# Patient Record
Sex: FEMALE | Race: WHITE | Employment: OTHER | URBAN - METROPOLITAN AREA
[De-identification: names, ages, dates, MRNs, and addresses within clinical notes are randomized per-mention and may not be internally consistent; named-entity substitution may affect disease eponyms.]

---

## 2017-06-26 ENCOUNTER — HOSPITAL ENCOUNTER (OUTPATIENT)
Dept: GENERAL RADIOLOGY | Age: 67
Discharge: HOME | End: 2017-06-26
Attending: OBSTETRICS & GYNECOLOGY | Admitting: OBSTETRICS & GYNECOLOGY

## 2017-06-26 DIAGNOSIS — M85.80 OTHER SPECIFIED DISORDERS OF BONE DENSITY AND STRUCTURE, UNSPECIFIED SITE: ICD-10-CM

## 2017-06-26 DIAGNOSIS — Z13.820 OSTEOPOROSIS SCREENING: ICD-10-CM

## 2017-06-26 DIAGNOSIS — M85.80 OSTEOPENIA: ICD-10-CM

## 2017-07-26 ENCOUNTER — HOSPITAL ENCOUNTER (OUTPATIENT)
Dept: GENERAL RADIOLOGY | Age: 67
Discharge: HOME | End: 2017-07-26
Attending: INTERNAL MEDICINE | Admitting: INTERNAL MEDICINE

## 2017-07-26 DIAGNOSIS — R13.10 DYSPHAGIA, UNSPECIFIED TYPE: ICD-10-CM

## 2017-09-11 ENCOUNTER — HOSPITAL ENCOUNTER (OUTPATIENT)
Dept: MAMMOGRAPHY | Age: 67
Discharge: HOME | End: 2017-09-11
Admitting: INTERNAL MEDICINE

## 2017-09-11 DIAGNOSIS — Z12.31 ENCOUNTER FOR SCREENING MAMMOGRAM FOR BREAST CANCER: ICD-10-CM

## 2017-10-24 ENCOUNTER — HOSPITAL ENCOUNTER (OUTPATIENT)
Dept: OTHER | Age: 67
Discharge: HOME | End: 2017-10-24
Attending: INTERNAL MEDICINE | Admitting: INTERNAL MEDICINE

## 2017-10-24 VITALS
OXYGEN SATURATION: 98 % | BODY MASS INDEX: 32.3 KG/M2 | HEART RATE: 72 BPM | TEMPERATURE: 98 F | WEIGHT: 201 LBS | HEIGHT: 66 IN | RESPIRATION RATE: 20 BRPM | DIASTOLIC BLOOD PRESSURE: 72 MMHG | SYSTOLIC BLOOD PRESSURE: 149 MMHG

## 2017-10-24 RX ORDER — SODIUM CHLORIDE, SODIUM LACTATE, POTASSIUM CHLORIDE, CALCIUM CHLORIDE 600; 310; 30; 20 MG/100ML; MG/100ML; MG/100ML; MG/100ML
INJECTION, SOLUTION INTRAVENOUS CONTINUOUS
Status: DISCONTINUED | OUTPATIENT
Start: 2017-10-24 | End: 2017-10-25 | Stop reason: HOSPADM

## 2017-10-24 RX ORDER — CETIRIZINE HYDROCHLORIDE 5 MG/1
5 TABLET ORAL DAILY
COMMUNITY
End: 2020-03-05

## 2017-10-24 RX ORDER — BENZTROPINE MESYLATE 1 MG/1
1 TABLET ORAL 2 TIMES DAILY
COMMUNITY
End: 2019-05-15

## 2017-10-24 RX ADMIN — SODIUM CHLORIDE, SODIUM LACTATE, POTASSIUM CHLORIDE, CALCIUM CHLORIDE: 600; 310; 30; 20 INJECTION, SOLUTION INTRAVENOUS at 08:18

## 2017-10-24 ASSESSMENT — PAIN DESCRIPTION - DESCRIPTORS: DESCRIPTORS: ACHING

## 2017-10-24 ASSESSMENT — PAIN - FUNCTIONAL ASSESSMENT: PAIN_FUNCTIONAL_ASSESSMENT: 0-10

## 2017-10-24 NOTE — PLAN OF CARE
ENDOSCOPY  PRE, INTRA, & POST PROCEDURAL  CARE PLAN    HEMODYNAMIC STATUS  INTERDISCIPLINARY   Goal: Hemodynamic Status to Baseline / Discharge Criteria Met  Interventions     1. Obtain Patient  Medical /  Surgical History     1 Assess & Review Allergies Prior to Endo & All  Meds (PRN)     2. Assess / Monitor Vital Signs / LOC (PRN). Intra Procedure VS/ LOC  Q5 Mins  & As Per Policy Until Discharge. 3. Obtain Baseline Forrest & Post Procedural  Forrest Per   Policy     4. Check Monitors, Alarms On     5. Patient Re Assessed by Physician     6. Monitor  I&O Post Procedure   NURSING   SAFETY & PSYCHO SOCIAL  INTERDISCIPLINARY   Goal: Patient Returns to Baseline Activity/ Meets Discharge Criteria  Interventions     1. Greet Patient with ID Badge/ Picture in View (PRN)     2. Be Available & Sensitive to Patients Needs (PRN)     3. Communicate referral to Pastoral Care as Appropriate (PRN)     4. Provide Age Specific Measures (PRN)     4. Admission Data Base Reviewed     5. Administer Meds Per Orders (PRN)     5. Maintain Baseline Activity  Or Activity as Ordered Per Physician     NUTRITION   INTERDISCIPLINARY   Goal: Patient to baseline/ Improved Nutrition  Interventions     1. Assess NPO Status / Notify Physician if Necessary (PRN)     2. NPO per Physician Orders     3. Assess Nutritional Status (PRN)     4. Assess Ability to Swallow as Indicated     LAB & DIAGNOSTICS   Goal: Additional Tests per Physicians   Orders  Interventions     1. Lab & Diagnostics per Physician Orders (PRN)     2.  Obtain  Urine / Serum HCG & FSBS On All Diabetic Patients  Per Policy & (PRN)     3. Assess Lab Time Out  for  Patient Safety as Needed (PRN)   RESPIRATORY  INTERDISCIPLINARY   Goal: Airway   Patency, SAO2   Saturation, Cough mechanism Maintained   Interventions     1. Evaluate Bilateral Breath Sounds  Baseline, (PRN), & Prior to Discharge     2. Weight & Height Noted ( PRN)     3.   Assess Baseline SPo2 (PRN) 4. Monitor   SAO2   Continuously During Sedation/ Analgesia & Until Patient Meets D/C Criteria. 5. Resuscitation Equipment Available (PRN)   GASTROINTESTINAL  INTERDISCIPLINARY   Goal: Bowel Sounds Maintained   Interventions     1. Evaluate Baseline Bowel Sounds, (PRN), & Prior to Discharge     2. Monitor  Abdominal status of Patient Throughout Procedure     KNOWLEDGE DEFICIT, EDUCATION, DISCHARGE PLAN   INTERDISCIPLINARY    Patient / SO verbalize Understanding Of Procedural discharge Instructions  Interventions     1. Obtain Informed Consent (PRN)      2. Initiate ENDO Plan of Care, Answer Questions (PRN)       3. Assess Patients Ability to Understand Information (PRN)     3. Discharge Planning ; Assure  Presence of   upon Patient Discharge     4. Education / Communication  Ongoing As Appropriate     5. Keep Families Aware of Delays As Appropriate     6. Reinforce Discharge Teaching / Post  Procedure  Instructions (PRN)    PAIN MANAGEMENT  INTERDISCIPLINARY   Goal:Patient Return to Pre Procedure Comfort  Interventions     1. Assess Baseline  Pain Level (PRN)     2. Intra Procedure ;  Evaluation & Assessment Of  Pain  is Ongoing     3. Post procedure; Assess Pain Level Once Awake/ Prior  To Discharge     2. Administer Analgesics as Ordered (PRN)      3. Assess Effectiveness of Pain Management (PRN)       Re-Assess Patient  after all Interventions. Assess Pain Level 30 - 60 Minutes After Pain Management Intervention.      4. Provide Discharge Teaching

## 2017-10-24 NOTE — OP NOTE
[address]                [address] [place] [address]                               OPERATIVE REPORT    PATIENT NAME: Wade Long                       :             1950  MED REC NO:   [id number]                           ROOM:  ACCOUNT NO:   [de-identified]                           ADMISSION DATE:  10/24/2017  PROVIDER:     Ambika Vega MD    DATE OF PROCEDURE:  10/24/2017    PREPROCEDURE DIAGNOSES:  1. History of colon polyps. 2.  Chronic abdominal pain. PROCEDURE:  Colonoscopy incomplete due to poor prep. POSTPROCEDURE DIAGNOSIS:  Incomplete colonoscopy to the rectum. PROCEDURE INDICATIONS:  A 72-year-old female with a history of GERD,  chronic abdominal pain, anxiety, depression, mitral valve prolapse and  prior history of colon polyps, presents for surveillance. She had a  colonoscopy a year ago, but it was inadequate prep. She was just  recommended to repeat colonoscopy with a two-day prep. She presents  today for routine surveillance colonoscopy. MEDICATIONS:  None. PROCEDURE DETAILS:  Informed consent was obtained after discussing  risks, benefits, and alternatives. Full history and physical was  performed. The patient was classified as ASA class II. Medications  were not given. Cardiopulmonary status was continuously monitored  throughout the procedure. The patient was placed in left lateral  decubitus position. Once the patient was deemed to be adequately  positioned, a standard pediatric colonoscope was inserted in the anus  and advanced to the rectum. The scope was not advanced to any further  due to inadequate prep. The procedure was aborted. The patient was  sent to the recovery unit. FINDINGS:  RECTUM:  The digital rectal exam was normal.  COLON:  There is a large amount of thick dark stool coating the mucosa  making it inadequate for evaluation. The procedure was aborted. Scope was withdrawn. The patient was transferred to recovery. SUMMARY:  1. Poor prep. 2.  Inadequate for visualization. RECOMMENDATIONS:  1. Discharged patient to home when standard parameters are met. 2.  Repeat colonoscopy with a two-day prep with MAC anesthesia. 3.  Follow up in the office as needed.     Brandy Kahn MD    D: 10/24/2017 8:56:17       T: 10/24/2017 9:32:18     [email or web address]  Job#: [id number]     Doc#: [id number]    CC:  Ba Ocampo MD.

## 2017-11-21 ENCOUNTER — HOSPITAL ENCOUNTER (OUTPATIENT)
Dept: SURGERY | Age: 67
Discharge: HOME | End: 2017-11-21
Attending: INTERNAL MEDICINE | Admitting: INTERNAL MEDICINE

## 2017-11-21 VITALS
BODY MASS INDEX: 32.3 KG/M2 | WEIGHT: 201 LBS | SYSTOLIC BLOOD PRESSURE: 147 MMHG | RESPIRATION RATE: 16 BRPM | OXYGEN SATURATION: 96 % | HEIGHT: 66 IN | TEMPERATURE: 97.2 F | DIASTOLIC BLOOD PRESSURE: 85 MMHG | HEART RATE: 74 BPM

## 2017-11-21 RX ORDER — SODIUM CHLORIDE, SODIUM LACTATE, POTASSIUM CHLORIDE, CALCIUM CHLORIDE 600; 310; 30; 20 MG/100ML; MG/100ML; MG/100ML; MG/100ML
INJECTION, SOLUTION INTRAVENOUS ONCE
Status: COMPLETED | OUTPATIENT
Start: 2017-11-21 | End: 2017-11-21

## 2017-11-21 RX ORDER — SODIUM CHLORIDE 0.9 % (FLUSH) 0.9 %
10 SYRINGE (ML) INJECTION EVERY 12 HOURS SCHEDULED
Status: DISCONTINUED | OUTPATIENT
Start: 2017-11-21 | End: 2017-11-22 | Stop reason: HOSPADM

## 2017-11-21 RX ORDER — MEPERIDINE HYDROCHLORIDE 25 MG/ML
12.5 INJECTION INTRAMUSCULAR; INTRAVENOUS; SUBCUTANEOUS EVERY 5 MIN PRN
Status: DISCONTINUED | OUTPATIENT
Start: 2017-11-21 | End: 2017-11-22 | Stop reason: HOSPADM

## 2017-11-21 RX ORDER — OXYCODONE HYDROCHLORIDE 5 MG/1
10 TABLET ORAL PRN
Status: ACTIVE | OUTPATIENT
Start: 2017-11-21 | End: 2017-11-21

## 2017-11-21 RX ORDER — MORPHINE SULFATE 4 MG/ML
1 INJECTION, SOLUTION INTRAMUSCULAR; INTRAVENOUS EVERY 5 MIN PRN
Status: DISCONTINUED | OUTPATIENT
Start: 2017-11-21 | End: 2017-11-22 | Stop reason: HOSPADM

## 2017-11-21 RX ORDER — LABETALOL HYDROCHLORIDE 5 MG/ML
5 INJECTION, SOLUTION INTRAVENOUS EVERY 10 MIN PRN
Status: DISCONTINUED | OUTPATIENT
Start: 2017-11-21 | End: 2017-11-22 | Stop reason: HOSPADM

## 2017-11-21 RX ORDER — OXYCODONE HYDROCHLORIDE 5 MG/1
5 TABLET ORAL PRN
Status: ACTIVE | OUTPATIENT
Start: 2017-11-21 | End: 2017-11-21

## 2017-11-21 RX ORDER — HYDRALAZINE HYDROCHLORIDE 20 MG/ML
5 INJECTION INTRAMUSCULAR; INTRAVENOUS EVERY 10 MIN PRN
Status: DISCONTINUED | OUTPATIENT
Start: 2017-11-21 | End: 2017-11-22 | Stop reason: HOSPADM

## 2017-11-21 RX ORDER — SODIUM CHLORIDE, SODIUM LACTATE, POTASSIUM CHLORIDE, CALCIUM CHLORIDE 600; 310; 30; 20 MG/100ML; MG/100ML; MG/100ML; MG/100ML
INJECTION, SOLUTION INTRAVENOUS CONTINUOUS
Status: DISCONTINUED | OUTPATIENT
Start: 2017-11-21 | End: 2017-11-22 | Stop reason: HOSPADM

## 2017-11-21 RX ORDER — LIDOCAINE HYDROCHLORIDE 10 MG/ML
1 INJECTION, SOLUTION INFILTRATION; PERINEURAL
Status: ACTIVE | OUTPATIENT
Start: 2017-11-21 | End: 2017-11-21

## 2017-11-21 RX ORDER — DIPHENHYDRAMINE HYDROCHLORIDE 50 MG/ML
12.5 INJECTION INTRAMUSCULAR; INTRAVENOUS
Status: ACTIVE | OUTPATIENT
Start: 2017-11-21 | End: 2017-11-21

## 2017-11-21 RX ORDER — SODIUM CHLORIDE 0.9 % (FLUSH) 0.9 %
10 SYRINGE (ML) INJECTION PRN
Status: DISCONTINUED | OUTPATIENT
Start: 2017-11-21 | End: 2017-11-22 | Stop reason: HOSPADM

## 2017-11-21 RX ORDER — METOCLOPRAMIDE HYDROCHLORIDE 5 MG/ML
10 INJECTION INTRAMUSCULAR; INTRAVENOUS
Status: ACTIVE | OUTPATIENT
Start: 2017-11-21 | End: 2017-11-21

## 2017-11-21 RX ADMIN — SODIUM CHLORIDE, SODIUM LACTATE, POTASSIUM CHLORIDE, CALCIUM CHLORIDE: 600; 310; 30; 20 INJECTION, SOLUTION INTRAVENOUS at 09:48

## 2017-11-21 ASSESSMENT — PAIN - FUNCTIONAL ASSESSMENT: PAIN_FUNCTIONAL_ASSESSMENT: 0-10

## 2017-11-21 NOTE — H&P
History and Physical / Pre-Sedation Assessment    Patient:  Celsa Nino   :   1950     Intended Procedure:  colon    HPI: screening    Nurses notes reviewed and agreed. Medications reviewed  Allergies: Allergies   Allergen Reactions    Lithium Other (See Comments)     Seizures     Prednisone     Sulfa Antibiotics     Tegretol [Carbamazepine]     Thorazine [Chlorpromazine Hcl]        Physical Exam:  Vital Signs: /71   Pulse 82   Temp 98.1 °F (36.7 °C) (Temporal)   Resp 19   Ht 5' 6\" (1.676 m)   Wt 201 lb (91.2 kg)   SpO2 97%   BMI 32.44 kg/m²    Airway:Normal  Pulmonary:Normal  Cardiac:Normal  Abdomen:Normal    Pre-Procedure Assessment / Plan:  ASA:CLASS 2  Level of Sedation Plan:MAC  Post Procedure plan: Return to same level of care    I assessed the patient and find that the patient is in satisfactory condition to proceed with the planned procedure and sedation plan. I have explained the risk, benefits, and alternatives to the procedure; the patient understands and agrees to proceed.        Crista  2017

## 2017-11-21 NOTE — PROCEDURES
Regan Covarrubias   11/21/2017  Colonoscopy  A pre-procedure re-evaluation was performed immediately prior to the procedure.   Preprocedure Dx:polyp  Postprocedure Dx: Diverticulosis  Medications: Procedural sedation with Versed & Fentanyl  Complications: None  Estimated Blood Loss: <5cc  Specimens: were not obtained  Ledy Linares

## 2017-11-21 NOTE — OP NOTE
[address]                  [address] [place] [address]                                 OPERATIVE REPORT    PATIENT NAME: Cristine Gross                      :        1950  MED REC NO:   [id number]                          ROOM:  ACCOUNT NO:   [de-identified]                          ADMIT DATE: 2017  PROVIDER:     Sherif Amin MD    DATE OF PROCEDURE:  2017    PREOPERATIVE DIAGNOSIS:  History of colonic polyps. POSTOPERATIVE DIAGNOSIS:  Diverticulosis. OPERATION PERFORMED:   Colonoscopy to cecum. SURGEON:  Sherif Amin MD    INDICATIONS:  This 19-year-old referred for evaluation because of previous  history of colonic polyps. This patient was on a two-day prep and still  the prep was suboptimal.    PROCEDURE:  Informed consent was obtained. IV sedation was given by  anesthesia department under monitored anesthesia care. FINDINGS:  RECTUM:  Normal.  SIGMOID COLON:  Showed a few diverticula. Prep was suboptimal.  There were  placed where there was still some solid stool. DESCENDING COLON:  Normal.  TRANSVERSE COLON:  Normal.  ASCENDING COLON TO CECUM:  There was still some amount of fecal effluent in  the right colon as well. I did not see any large lesions. The procedure was discontinued. IMPRESSION:  Normal colonoscopy. Diverticulosis. Prep was suboptimal.    SUGGESTIONS:  Would be to go ahead and have the patient follow up with Dr. Zackary Garcia. We will consider repeat colonoscopy in 5 years. She has been tried  multiple times here with colonoscopy and this is the third or fourth  attempt, and I think if she has any further symptoms I might be inclined to  probably go ahead and repeat CAT scan. Thank you for the consult. We will follow with you. Mina Ferreira MD    D: 2017 10:40:28       T: 2017 11:17:00     [email or web address]  Job#: [id number]     Doc#: [id number]    CC:   [facility],

## 2017-11-21 NOTE — ANESTHESIA PRE-OP
daily.      Historical Provider, MD   hydrocodone-acetaminophen (VICODIN) 5-500 MG per tablet Take 1 tablet by mouth every 6 hours as needed. Historical Provider, MD   oxybutynin (DITROPAN) 5 MG tablet   Take 10 mg by mouth nightly     Historical Provider, MD   phenytoin (DILANTIN) 100 MG ER capsule Take 200 mg by mouth 2 times daily. Historical Provider, MD   phenytoin (DILANTIN) 100 MG ER capsule Take 100 mg by mouth daily. Historical Provider, MD   risperidone (RISPERDAL) 1 MG tablet   Take 1 mg by mouth 3 times daily     Historical Provider, MD   sertraline (ZOLOFT) 100 MG tablet   Take 100 mg by mouth daily     Historical Provider, MD   topiramate (TOPAMAX) 100 MG tablet Take 150 mg by mouth daily     Historical Provider, MD   albuterol (PROVENTIL HFA;VENTOLIN HFA) 108 (90 BASE) MCG/ACT inhaler Inhale 2 puffs into the lungs 2 times daily. Historical Provider, MD   carvedilol (COREG) 6.25 MG tablet Take 12.5 mg by mouth daily Indications: take [address] Provider, MD       Current medications:    Current Outpatient Prescriptions   Medication Sig Dispense Refill    benztropine (COGENTIN) 1 MG tablet Take 1 mg by mouth 2 times daily      Artificial Saliva (BIOTENE ORALBALANCE DRY MOUTH) LIQD liquid Take 10 mLs by mouth as needed      cetirizine (ZYRTEC) 5 MG tablet Take 5 mg by mouth daily      tiotropium (SPIRIVA) 18 MCG inhalation capsule Inhale 18 mcg into the lungs daily      miconazole (MICOTIN) 2 % cream Apply topically 2 times daily Apply topically 2 times daily.       traMADol (ULTRAM) 50 MG tablet Take 50 mg by mouth daily      fluticasone (FLONASE) 50 MCG/ACT nasal spray 1 spray by Nasal route daily      QUEtiapine (SEROQUEL) 100 MG tablet Take 100 mg by mouth 3 times daily      ARIPiprazole (ABILIFY) 15 MG tablet Take 15 mg by mouth 2 times daily      atorvastatin (LIPITOR) 20 MG tablet Take 20 mg by mouth nightly      docusate sodium (COLACE) 100 MG capsule Take 200 mg by mouth daily      donepezil (ARICEPT) 10 MG tablet Take 10 mg by mouth nightly      nortriptyline (PAMELOR) 10 MG capsule Take 10 mg by mouth nightly      omeprazole (PRILOSEC) 20 MG capsule Take 20 mg by mouth daily      trihexyphenidyl (ARTANE) 2 MG tablet Take 2 mg by mouth 2 times daily      therapeutic multivitamin-minerals (THERAGRAN-M) tablet Take 1 tablet by mouth daily.  hydrocodone-acetaminophen (VICODIN) 5-500 MG per tablet Take 1 tablet by mouth every 6 hours as needed.  oxybutynin (DITROPAN) 5 MG tablet   Take 10 mg by mouth nightly       phenytoin (DILANTIN) 100 MG ER capsule Take 200 mg by mouth 2 times daily.  phenytoin (DILANTIN) 100 MG ER capsule Take 100 mg by mouth daily.  risperidone (RISPERDAL) 1 MG tablet   Take 1 mg by mouth 3 times daily       sertraline (ZOLOFT) 100 MG tablet   Take 100 mg by mouth daily       topiramate (TOPAMAX) 100 MG tablet Take 150 mg by mouth daily       albuterol (PROVENTIL HFA;VENTOLIN HFA) 108 (90 BASE) MCG/ACT inhaler Inhale 2 puffs into the lungs 2 times daily.  carvedilol (COREG) 6.25 MG tablet Take 12.5 mg by mouth daily Indications: take dos        No current facility-administered medications for this encounter. Allergies: Allergies   Allergen Reactions    Lithium Other (See Comments)     Seizures     Prednisone     Sulfa Antibiotics     Tegretol [Carbamazepine]     Thorazine [Chlorpromazine Hcl]        Problem List:  There is no problem list on file for this patient.       Past Medical History:        Diagnosis Date    Bipolar 1 disorder ([facility] [address]     COPD (chronic obstructive pulmonary disease) (HCC)     Degenerative disc disease     Gastric ulcer     History of blood transfusion     Hx of lumpectomy     right breast    Hyperlipidemia     Hypertension     Mitral valve prolapse     OCD (obsessive compulsive disorder)     Thyroid disease        Past Surgical History:        Procedure

## 2017-12-06 NOTE — ANESTHESIA POST-OP
Postoperative Anesthesia Note    Name:    Karie Rush  MRN:      [id number]    No data found. LABS:    CBC  Lab Results   Component Value Date/Time    WBC 9.2 01/31/2016 08:00 PM    HGB 13.3 01/31/2016 08:00 PM    HCT 40.6 01/31/2016 08:00 PM     01/31/2016 08:00 PM     RENAL  Lab Results   Component Value Date/Time     01/31/2016 08:00 PM    K 4.2 01/31/2016 08:00 PM     01/31/2016 08:00 PM    CO2 25 01/31/2016 08:00 PM    BUN 9 01/31/2016 08:00 PM    CREATININE <0.5 (L) 09/26/2016 02:44 PM    GLUCOSE 103 (H) 01/31/2016 08:00 PM     COAGS  No results found for: PROTIME, INR, APTT    Intake & Output:  No intake/output data recorded. Nausea & Vomiting:  No    Level of Consciousness:  Awake    Pain Assessment:  Adequate analgesia    Anesthesia Complications:  No apparent anesthetic complications    SUMMARY      Vital signs stable  OK to discharge from Stage I post anesthesia care.   Care transferred from Anesthesiology department on discharge from perioperative area

## 2018-11-05 ENCOUNTER — HOSPITAL ENCOUNTER (OUTPATIENT)
Dept: MAMMOGRAPHY | Age: 68
Discharge: HOME | End: 2018-11-05
Payer: MEDICARE

## 2018-11-05 DIAGNOSIS — Z12.39 BREAST CANCER SCREENING: ICD-10-CM

## 2018-11-05 PROCEDURE — 77063 BREAST TOMOSYNTHESIS BI: CPT

## 2018-12-20 ENCOUNTER — HOSPITAL ENCOUNTER (OUTPATIENT)
Age: 68
Setting detail: OUTPATIENT SURGERY
Discharge: HOME | End: 2018-12-20
Attending: INTERNAL MEDICINE | Admitting: INTERNAL MEDICINE
Payer: MEDICARE

## 2018-12-20 ENCOUNTER — ANESTHESIA (OUTPATIENT)
Dept: ENDOSCOPY | Age: 68
End: 2018-12-20
Payer: MEDICARE

## 2018-12-20 ENCOUNTER — ANESTHESIA EVENT (OUTPATIENT)
Dept: ENDOSCOPY | Age: 68
End: 2018-12-20
Payer: MEDICARE

## 2018-12-20 VITALS
DIASTOLIC BLOOD PRESSURE: 68 MMHG | SYSTOLIC BLOOD PRESSURE: 148 MMHG | OXYGEN SATURATION: 97 % | RESPIRATION RATE: 24 BRPM

## 2018-12-20 VITALS
TEMPERATURE: 98.5 F | HEART RATE: 81 BPM | HEIGHT: 64 IN | WEIGHT: 230 LBS | OXYGEN SATURATION: 96 % | RESPIRATION RATE: 18 BRPM | SYSTOLIC BLOOD PRESSURE: 145 MMHG | BODY MASS INDEX: 39.27 KG/M2 | DIASTOLIC BLOOD PRESSURE: 56 MMHG

## 2018-12-20 PROCEDURE — 2500000003 HC RX 250 WO HCPCS: Performed by: NURSE ANESTHETIST, CERTIFIED REGISTERED

## 2018-12-20 PROCEDURE — 6360000002 HC RX W HCPCS: Performed by: NURSE ANESTHETIST, CERTIFIED REGISTERED

## 2018-12-20 PROCEDURE — 3700000001 HC ADD 15 MINUTES (ANESTHESIA): Performed by: INTERNAL MEDICINE

## 2018-12-20 PROCEDURE — 3700000000 HC ANESTHESIA ATTENDED CARE: Performed by: INTERNAL MEDICINE

## 2018-12-20 PROCEDURE — 88342 IMHCHEM/IMCYTCHM 1ST ANTB: CPT

## 2018-12-20 PROCEDURE — 2709999900 HC NON-CHARGEABLE SUPPLY: Performed by: INTERNAL MEDICINE

## 2018-12-20 PROCEDURE — 88312 SPECIAL STAINS GROUP 1: CPT

## 2018-12-20 PROCEDURE — 7100000010 HC PHASE II RECOVERY - FIRST 15 MIN: Performed by: INTERNAL MEDICINE

## 2018-12-20 PROCEDURE — 88305 TISSUE EXAM BY PATHOLOGIST: CPT

## 2018-12-20 PROCEDURE — 2580000003 HC RX 258: Performed by: INTERNAL MEDICINE

## 2018-12-20 PROCEDURE — 3609012400 HC EGD TRANSORAL BIOPSY SINGLE/MULTIPLE: Performed by: INTERNAL MEDICINE

## 2018-12-20 PROCEDURE — 88341 IMHCHEM/IMCYTCHM EA ADD ANTB: CPT

## 2018-12-20 PROCEDURE — 7100000011 HC PHASE II RECOVERY - ADDTL 15 MIN: Performed by: INTERNAL MEDICINE

## 2018-12-20 RX ORDER — FENTANYL CITRATE 50 UG/ML
INJECTION, SOLUTION INTRAMUSCULAR; INTRAVENOUS PRN
Status: DISCONTINUED | OUTPATIENT
Start: 2018-12-20 | End: 2018-12-20 | Stop reason: SDUPTHER

## 2018-12-20 RX ORDER — LIDOCAINE HYDROCHLORIDE 20 MG/ML
INJECTION, SOLUTION INFILTRATION; PERINEURAL PRN
Status: DISCONTINUED | OUTPATIENT
Start: 2018-12-20 | End: 2018-12-20 | Stop reason: SDUPTHER

## 2018-12-20 RX ORDER — MIDAZOLAM HYDROCHLORIDE 5 MG/ML
INJECTION INTRAMUSCULAR; INTRAVENOUS PRN
Status: DISCONTINUED | OUTPATIENT
Start: 2018-12-20 | End: 2018-12-20 | Stop reason: SDUPTHER

## 2018-12-20 RX ORDER — SODIUM CHLORIDE, SODIUM LACTATE, POTASSIUM CHLORIDE, CALCIUM CHLORIDE 600; 310; 30; 20 MG/100ML; MG/100ML; MG/100ML; MG/100ML
INJECTION, SOLUTION INTRAVENOUS CONTINUOUS
Status: DISCONTINUED | OUTPATIENT
Start: 2018-12-20 | End: 2018-12-20 | Stop reason: HOSPADM

## 2018-12-20 RX ORDER — PROPOFOL 10 MG/ML
INJECTION, EMULSION INTRAVENOUS PRN
Status: DISCONTINUED | OUTPATIENT
Start: 2018-12-20 | End: 2018-12-20 | Stop reason: SDUPTHER

## 2018-12-20 RX ADMIN — PROPOFOL 30 MG: 10 INJECTION, EMULSION INTRAVENOUS at 13:40

## 2018-12-20 RX ADMIN — LIDOCAINE HYDROCHLORIDE 60 MG: 20 INJECTION, SOLUTION INFILTRATION; PERINEURAL at 13:32

## 2018-12-20 RX ADMIN — FENTANYL CITRATE 50 MCG: 50 INJECTION INTRAMUSCULAR; INTRAVENOUS at 13:32

## 2018-12-20 RX ADMIN — FENTANYL CITRATE 25 MCG: 50 INJECTION INTRAMUSCULAR; INTRAVENOUS at 13:38

## 2018-12-20 RX ADMIN — FENTANYL CITRATE 25 MCG: 50 INJECTION INTRAMUSCULAR; INTRAVENOUS at 13:41

## 2018-12-20 RX ADMIN — SODIUM CHLORIDE, POTASSIUM CHLORIDE, SODIUM LACTATE AND CALCIUM CHLORIDE: 600; 310; 30; 20 INJECTION, SOLUTION INTRAVENOUS at 13:27

## 2018-12-20 RX ADMIN — PROPOFOL 60 MG: 10 INJECTION, EMULSION INTRAVENOUS at 13:35

## 2018-12-20 RX ADMIN — MIDAZOLAM HYDROCHLORIDE 2.5 MG: 5 INJECTION INTRAMUSCULAR; INTRAVENOUS at 13:32

## 2018-12-20 ASSESSMENT — PAIN DESCRIPTION - DESCRIPTORS
DESCRIPTORS: ACHING

## 2018-12-20 ASSESSMENT — PAIN DESCRIPTION - LOCATION
LOCATION: HIP

## 2018-12-20 ASSESSMENT — PAIN DESCRIPTION - PAIN TYPE
TYPE: CHRONIC PAIN

## 2018-12-20 ASSESSMENT — PAIN - FUNCTIONAL ASSESSMENT: PAIN_FUNCTIONAL_ASSESSMENT: 0-10

## 2018-12-20 ASSESSMENT — PAIN SCALES - GENERAL
PAINLEVEL_OUTOF10: 7

## 2018-12-20 ASSESSMENT — PAIN DESCRIPTION - ORIENTATION
ORIENTATION: RIGHT;LEFT

## 2018-12-20 NOTE — ANESTHESIA PRE PROCEDURE
Department of Anesthesiology  Preprocedure Note       Name:  Whitney Arellano   Age:  79 y.o.  :  1950                                          MRN:  [id number]         Date:  2018      Surgeon: Xin Lopez):  Skylar Bernard DO    Procedure: EGD BIOPSY (N/A )    Medications prior to admission:   Prior to Admission medications    Medication Sig Start Date End Date Taking? Authorizing Provider   benztropine (COGENTIN) 1 MG tablet Take 1 mg by mouth 2 times daily    Historical Provider, MD   Artificial Saliva (BIOTENE ORALBALANCE DRY MOUTH) LIQD liquid Take 10 mLs by mouth as needed    Historical Provider, MD   cetirizine (ZYRTEC) 5 MG tablet Take 5 mg by mouth daily    Historical Provider, MD   tiotropium (SPIRIVA) 18 MCG inhalation capsule Inhale 18 mcg into the lungs daily    Historical Provider, MD   miconazole (MICOTIN) 2 % cream Apply topically 2 times daily Apply topically 2 times daily.     Historical Provider, MD   traMADol (ULTRAM) 50 MG tablet Take 50 mg by mouth daily    Historical Provider, MD   fluticasone (FLONASE) 50 MCG/ACT nasal spray 1 spray by Nasal route daily    Historical Provider, MD   QUEtiapine (SEROQUEL) 100 MG tablet Take 100 mg by mouth 3 times daily    Historical Provider, MD   ARIPiprazole (ABILIFY) 15 MG tablet Take 15 mg by mouth 2 times daily    Historical Provider, MD   atorvastatin (LIPITOR) 20 MG tablet Take 20 mg by mouth nightly    Historical Provider, MD   docusate sodium (COLACE) 100 MG capsule Take 200 mg by mouth daily    Historical Provider, MD   donepezil (ARICEPT) 10 MG tablet Take 10 mg by mouth nightly    Historical Provider, MD   nortriptyline (PAMELOR) 10 MG capsule Take 10 mg by mouth nightly    Historical Provider, MD   omeprazole (PRILOSEC) 20 MG capsule Take 20 mg by mouth daily    Historical Provider, MD   trihexyphenidyl (ARTANE) 2 MG tablet Take 2 mg by mouth 2 times daily    Historical Provider, MD   therapeutic multivitamin-minerals (THERAGRAN-M)

## 2018-12-20 NOTE — ANESTHESIA POSTPROCEDURE EVALUATION
Department of Anesthesiology  Postprocedure Note    Patient: See Gallegos  MRN: [id number]  YOB: 1950  Date of evaluation: 12/20/2018  Time:  2:58 PM     Procedure Summary     Date:  12/20/18 Room / Location:  [facility] ENDO 01 / [address] ENDOSCOPY    Anesthesia Start:  1327 Anesthesia Stop:  6035    Procedure:  EGD BIOPSY (N/A ) Diagnosis:  (DYSPHAGIA)    Surgeon:  Angelique Sanchez DO Responsible Provider:  Leopoldo Gleason, MD    Anesthesia Type:  MAC ASA Status:  3          Anesthesia Type: No value filed. Forrest Phase I: Forrest Score: 10    Forrest Phase II: Forrest Score: 10    Last vitals: Reviewed and per EMR flowsheets.        Anesthesia Post Evaluation    Patient location during evaluation: PACU  Patient participation: complete - patient participated  Level of consciousness: awake and alert  Pain score: 0  Airway patency: patent  Nausea & Vomiting: no nausea and no vomiting  Complications: no  Cardiovascular status: blood pressure returned to baseline  Respiratory status: acceptable  Hydration status: stable

## 2019-02-11 ENCOUNTER — HOSPITAL ENCOUNTER (OUTPATIENT)
Dept: GENERAL RADIOLOGY | Age: 69
Discharge: HOME | End: 2019-02-11
Payer: MEDICARE

## 2019-02-11 DIAGNOSIS — R13.10 PROBLEMS WITH SWALLOWING AND MASTICATION: ICD-10-CM

## 2019-02-11 PROCEDURE — 74230 X-RAY XM SWLNG FUNCJ C+: CPT

## 2019-02-25 ENCOUNTER — HOSPITAL ENCOUNTER (OUTPATIENT)
Age: 69
Setting detail: OUTPATIENT SURGERY
Discharge: HOME | End: 2019-02-25
Attending: INTERNAL MEDICINE | Admitting: INTERNAL MEDICINE
Payer: MEDICARE

## 2019-02-25 ENCOUNTER — ANESTHESIA (OUTPATIENT)
Dept: ENDOSCOPY | Age: 69
End: 2019-02-25
Payer: MEDICARE

## 2019-02-25 ENCOUNTER — ANESTHESIA EVENT (OUTPATIENT)
Dept: ENDOSCOPY | Age: 69
End: 2019-02-25
Payer: MEDICARE

## 2019-02-25 VITALS
WEIGHT: 215 LBS | SYSTOLIC BLOOD PRESSURE: 110 MMHG | RESPIRATION RATE: 16 BRPM | BODY MASS INDEX: 35.82 KG/M2 | DIASTOLIC BLOOD PRESSURE: 49 MMHG | HEART RATE: 77 BPM | HEIGHT: 65 IN | TEMPERATURE: 97.6 F | OXYGEN SATURATION: 100 %

## 2019-02-25 VITALS
OXYGEN SATURATION: 95 % | RESPIRATION RATE: 18 BRPM | SYSTOLIC BLOOD PRESSURE: 146 MMHG | DIASTOLIC BLOOD PRESSURE: 63 MMHG

## 2019-02-25 PROCEDURE — 7100000010 HC PHASE II RECOVERY - FIRST 15 MIN: Performed by: INTERNAL MEDICINE

## 2019-02-25 PROCEDURE — 6360000002 HC RX W HCPCS: Performed by: NURSE ANESTHETIST, CERTIFIED REGISTERED

## 2019-02-25 PROCEDURE — 7100000011 HC PHASE II RECOVERY - ADDTL 15 MIN: Performed by: INTERNAL MEDICINE

## 2019-02-25 PROCEDURE — 2580000003 HC RX 258: Performed by: INTERNAL MEDICINE

## 2019-02-25 PROCEDURE — 3609012800 HC EGD DIAGNOSTIC ONLY: Performed by: INTERNAL MEDICINE

## 2019-02-25 PROCEDURE — 2709999900 HC NON-CHARGEABLE SUPPLY: Performed by: INTERNAL MEDICINE

## 2019-02-25 PROCEDURE — 2500000003 HC RX 250 WO HCPCS: Performed by: NURSE ANESTHETIST, CERTIFIED REGISTERED

## 2019-02-25 PROCEDURE — 3609015300 HC ESOPHAGEAL DILATION MALONEY: Performed by: INTERNAL MEDICINE

## 2019-02-25 PROCEDURE — 3700000000 HC ANESTHESIA ATTENDED CARE: Performed by: INTERNAL MEDICINE

## 2019-02-25 RX ORDER — PROPOFOL 10 MG/ML
INJECTION, EMULSION INTRAVENOUS PRN
Status: DISCONTINUED | OUTPATIENT
Start: 2019-02-25 | End: 2019-02-25 | Stop reason: SDUPTHER

## 2019-02-25 RX ORDER — SODIUM CHLORIDE, SODIUM LACTATE, POTASSIUM CHLORIDE, CALCIUM CHLORIDE 600; 310; 30; 20 MG/100ML; MG/100ML; MG/100ML; MG/100ML
INJECTION, SOLUTION INTRAVENOUS CONTINUOUS
Status: DISCONTINUED | OUTPATIENT
Start: 2019-02-25 | End: 2019-02-25 | Stop reason: HOSPADM

## 2019-02-25 RX ORDER — LIDOCAINE HYDROCHLORIDE 20 MG/ML
INJECTION, SOLUTION EPIDURAL; INFILTRATION; INTRACAUDAL; PERINEURAL PRN
Status: DISCONTINUED | OUTPATIENT
Start: 2019-02-25 | End: 2019-02-25 | Stop reason: SDUPTHER

## 2019-02-25 RX ADMIN — PROPOFOL 80 MG: 10 INJECTION, EMULSION INTRAVENOUS at 11:05

## 2019-02-25 RX ADMIN — SODIUM CHLORIDE, POTASSIUM CHLORIDE, SODIUM LACTATE AND CALCIUM CHLORIDE: 600; 310; 30; 20 INJECTION, SOLUTION INTRAVENOUS at 10:58

## 2019-02-25 RX ADMIN — LIDOCAINE HYDROCHLORIDE 50 MG: 20 INJECTION, SOLUTION EPIDURAL; INFILTRATION; INTRACAUDAL; PERINEURAL at 11:05

## 2019-02-25 ASSESSMENT — PAIN DESCRIPTION - DESCRIPTORS: DESCRIPTORS: ACHING

## 2019-02-25 ASSESSMENT — PAIN - FUNCTIONAL ASSESSMENT: PAIN_FUNCTIONAL_ASSESSMENT: 0-10

## 2019-05-15 ENCOUNTER — HOSPITAL ENCOUNTER (OUTPATIENT)
Dept: GENERAL RADIOLOGY | Age: 69
Discharge: HOME | End: 2019-05-15
Payer: MEDICARE

## 2019-05-15 DIAGNOSIS — R13.10 DYSPHAGIA, UNSPECIFIED TYPE: ICD-10-CM

## 2019-05-15 PROBLEM — F42.9 OBSESSIVE-COMPULSIVE DISORDER: Status: ACTIVE | Noted: 2019-05-15

## 2019-05-15 PROCEDURE — 74230 X-RAY XM SWLNG FUNCJ C+: CPT

## 2020-03-05 RX ORDER — OXYCODONE HYDROCHLORIDE AND ACETAMINOPHEN 5; 325 MG/1; MG/1
1 TABLET ORAL 4 TIMES DAILY
COMMUNITY

## 2020-03-05 RX ORDER — FUROSEMIDE 20 MG/1
20 TABLET ORAL DAILY
COMMUNITY

## 2020-03-05 RX ORDER — LOSARTAN POTASSIUM 25 MG/1
12.5 TABLET ORAL DAILY
COMMUNITY

## 2020-03-05 RX ORDER — SIMETHICONE 180 MG
CAPSULE ORAL 3 TIMES DAILY
COMMUNITY

## 2020-03-05 RX ORDER — LORATADINE 10 MG/1
10 TABLET ORAL DAILY
COMMUNITY

## 2020-03-05 RX ORDER — NICOTINE 21 MG/24HR
1 PATCH, TRANSDERMAL 24 HOURS TRANSDERMAL EVERY 24 HOURS
COMMUNITY

## 2020-03-05 RX ORDER — BUSPIRONE HYDROCHLORIDE 30 MG/1
30 TABLET ORAL 2 TIMES DAILY
COMMUNITY

## 2020-03-05 RX ORDER — OMEPRAZOLE 20 MG/1
20 CAPSULE, DELAYED RELEASE ORAL DAILY
COMMUNITY

## 2020-03-05 RX ORDER — PNV NO.95/FERROUS FUM/FOLIC AC 28MG-0.8MG
TABLET ORAL 2 TIMES DAILY
COMMUNITY

## 2020-03-05 RX ORDER — POLYVINYL ALCOHOL 14 MG/ML
2 SOLUTION/ DROPS OPHTHALMIC EVERY 4 HOURS PRN
COMMUNITY

## 2020-03-05 RX ORDER — MAGNESIUM HYDROXIDE/ALUMINUM HYDROXICE/SIMETHICONE 120; 1200; 1200 MG/30ML; MG/30ML; MG/30ML
30 SUSPENSION ORAL EVERY 4 HOURS PRN
COMMUNITY

## 2020-03-05 RX ORDER — NYSTATIN 100000 U/G
CREAM TOPICAL 2 TIMES DAILY PRN
COMMUNITY

## 2020-03-05 RX ORDER — ANTACID TABLETS 500 MG/1
TABLET, CHEWABLE ORAL EVERY 8 HOURS PRN
COMMUNITY

## 2020-03-06 ENCOUNTER — ANESTHESIA EVENT (OUTPATIENT)
Dept: ENDOSCOPY | Age: 70
End: 2020-03-06
Payer: MEDICARE

## 2020-03-08 RX ORDER — SODIUM CHLORIDE 0.9 % (FLUSH) 0.9 %
10 SYRINGE (ML) INJECTION EVERY 12 HOURS SCHEDULED
Status: DISCONTINUED | OUTPATIENT
Start: 2020-03-08 | End: 2020-03-09 | Stop reason: HOSPADM

## 2020-03-08 RX ORDER — SODIUM CHLORIDE, SODIUM LACTATE, POTASSIUM CHLORIDE, CALCIUM CHLORIDE 600; 310; 30; 20 MG/100ML; MG/100ML; MG/100ML; MG/100ML
INJECTION, SOLUTION INTRAVENOUS ONCE
Status: DISCONTINUED | OUTPATIENT
Start: 2020-03-08 | End: 2020-03-09 | Stop reason: HOSPADM

## 2020-03-08 RX ORDER — SODIUM CHLORIDE, SODIUM LACTATE, POTASSIUM CHLORIDE, CALCIUM CHLORIDE 600; 310; 30; 20 MG/100ML; MG/100ML; MG/100ML; MG/100ML
INJECTION, SOLUTION INTRAVENOUS CONTINUOUS
Status: DISCONTINUED | OUTPATIENT
Start: 2020-03-08 | End: 2020-03-09 | Stop reason: HOSPADM

## 2020-03-08 RX ORDER — SODIUM CHLORIDE 0.9 % (FLUSH) 0.9 %
10 SYRINGE (ML) INJECTION PRN
Status: DISCONTINUED | OUTPATIENT
Start: 2020-03-08 | End: 2020-03-09 | Stop reason: HOSPADM

## 2020-03-08 RX ORDER — LIDOCAINE HYDROCHLORIDE 10 MG/ML
0.1 INJECTION, SOLUTION EPIDURAL; INFILTRATION; INTRACAUDAL; PERINEURAL
Status: ACTIVE | OUTPATIENT
Start: 2020-03-08 | End: 2020-03-08

## 2020-03-09 ENCOUNTER — ANESTHESIA (OUTPATIENT)
Dept: ENDOSCOPY | Age: 70
End: 2020-03-09
Payer: MEDICARE

## 2020-03-09 ENCOUNTER — HOSPITAL ENCOUNTER (OUTPATIENT)
Age: 70
Setting detail: OUTPATIENT SURGERY
Discharge: NURSING, REHAB OR LONG-TERM CARE | End: 2020-03-09
Attending: INTERNAL MEDICINE | Admitting: INTERNAL MEDICINE
Payer: MEDICARE

## 2020-03-09 VITALS
HEIGHT: 65 IN | OXYGEN SATURATION: 97 % | DIASTOLIC BLOOD PRESSURE: 85 MMHG | BODY MASS INDEX: 38.15 KG/M2 | HEART RATE: 83 BPM | SYSTOLIC BLOOD PRESSURE: 166 MMHG | TEMPERATURE: 98.4 F | RESPIRATION RATE: 18 BRPM | WEIGHT: 229 LBS

## 2020-03-09 VITALS — OXYGEN SATURATION: 96 % | SYSTOLIC BLOOD PRESSURE: 119 MMHG | DIASTOLIC BLOOD PRESSURE: 67 MMHG

## 2020-03-09 LAB
GLUCOSE BLD-MCNC: 121 MG/DL (ref 70–99)
PERFORMED ON: ABNORMAL

## 2020-03-09 PROCEDURE — 3700000000 HC ANESTHESIA ATTENDED CARE: Performed by: INTERNAL MEDICINE

## 2020-03-09 PROCEDURE — 7100000011 HC PHASE II RECOVERY - ADDTL 15 MIN: Performed by: INTERNAL MEDICINE

## 2020-03-09 PROCEDURE — 3609012400 HC EGD TRANSORAL BIOPSY SINGLE/MULTIPLE: Performed by: INTERNAL MEDICINE

## 2020-03-09 PROCEDURE — 2500000003 HC RX 250 WO HCPCS: Performed by: ANESTHESIOLOGY

## 2020-03-09 PROCEDURE — 3609017700 HC EGD DILATION GASTRIC/DUODENAL STRICTURE: Performed by: INTERNAL MEDICINE

## 2020-03-09 PROCEDURE — 88305 TISSUE EXAM BY PATHOLOGIST: CPT

## 2020-03-09 PROCEDURE — 2580000003 HC RX 258: Performed by: ANESTHESIOLOGY

## 2020-03-09 PROCEDURE — 2709999900 HC NON-CHARGEABLE SUPPLY: Performed by: INTERNAL MEDICINE

## 2020-03-09 PROCEDURE — 6360000002 HC RX W HCPCS: Performed by: NURSE ANESTHETIST, CERTIFIED REGISTERED

## 2020-03-09 PROCEDURE — 3700000001 HC ADD 15 MINUTES (ANESTHESIA): Performed by: INTERNAL MEDICINE

## 2020-03-09 PROCEDURE — 2500000003 HC RX 250 WO HCPCS: Performed by: NURSE ANESTHETIST, CERTIFIED REGISTERED

## 2020-03-09 PROCEDURE — 7100000010 HC PHASE II RECOVERY - FIRST 15 MIN: Performed by: INTERNAL MEDICINE

## 2020-03-09 RX ORDER — OXYCODONE HYDROCHLORIDE AND ACETAMINOPHEN 5; 325 MG/1; MG/1
1 TABLET ORAL PRN
Status: DISCONTINUED | OUTPATIENT
Start: 2020-03-09 | End: 2020-03-09 | Stop reason: HOSPADM

## 2020-03-09 RX ORDER — OXYCODONE HYDROCHLORIDE AND ACETAMINOPHEN 5; 325 MG/1; MG/1
2 TABLET ORAL PRN
Status: DISCONTINUED | OUTPATIENT
Start: 2020-03-09 | End: 2020-03-09 | Stop reason: HOSPADM

## 2020-03-09 RX ORDER — LIDOCAINE HYDROCHLORIDE 20 MG/ML
INJECTION, SOLUTION INFILTRATION; PERINEURAL PRN
Status: DISCONTINUED | OUTPATIENT
Start: 2020-03-09 | End: 2020-03-09 | Stop reason: SDUPTHER

## 2020-03-09 RX ORDER — MORPHINE SULFATE 2 MG/ML
1 INJECTION, SOLUTION INTRAMUSCULAR; INTRAVENOUS EVERY 5 MIN PRN
Status: DISCONTINUED | OUTPATIENT
Start: 2020-03-09 | End: 2020-03-09 | Stop reason: HOSPADM

## 2020-03-09 RX ORDER — MORPHINE SULFATE 2 MG/ML
2 INJECTION, SOLUTION INTRAMUSCULAR; INTRAVENOUS EVERY 5 MIN PRN
Status: DISCONTINUED | OUTPATIENT
Start: 2020-03-09 | End: 2020-03-09 | Stop reason: HOSPADM

## 2020-03-09 RX ORDER — PROPOFOL 10 MG/ML
INJECTION, EMULSION INTRAVENOUS PRN
Status: DISCONTINUED | OUTPATIENT
Start: 2020-03-09 | End: 2020-03-09 | Stop reason: SDUPTHER

## 2020-03-09 RX ORDER — ONDANSETRON 2 MG/ML
4 INJECTION INTRAMUSCULAR; INTRAVENOUS
Status: DISCONTINUED | OUTPATIENT
Start: 2020-03-09 | End: 2020-03-09 | Stop reason: HOSPADM

## 2020-03-09 RX ADMIN — FAMOTIDINE 20 MG: 10 INJECTION INTRAVENOUS at 08:12

## 2020-03-09 RX ADMIN — PROPOFOL 50 MG: 10 INJECTION, EMULSION INTRAVENOUS at 08:37

## 2020-03-09 RX ADMIN — PROPOFOL 100 MG: 10 INJECTION, EMULSION INTRAVENOUS at 08:32

## 2020-03-09 RX ADMIN — SODIUM CHLORIDE, POTASSIUM CHLORIDE, SODIUM LACTATE AND CALCIUM CHLORIDE: 600; 310; 30; 20 INJECTION, SOLUTION INTRAVENOUS at 08:12

## 2020-03-09 RX ADMIN — PROPOFOL 50 MG: 10 INJECTION, EMULSION INTRAVENOUS at 08:35

## 2020-03-09 RX ADMIN — LIDOCAINE HYDROCHLORIDE 80 MG: 20 INJECTION, SOLUTION INFILTRATION; PERINEURAL at 08:32

## 2020-03-09 ASSESSMENT — PAIN SCALES - GENERAL
PAINLEVEL_OUTOF10: 0

## 2020-03-09 ASSESSMENT — PAIN - FUNCTIONAL ASSESSMENT: PAIN_FUNCTIONAL_ASSESSMENT: 0-10

## 2020-03-09 ASSESSMENT — LIFESTYLE VARIABLES: SMOKING_STATUS: 1

## 2020-03-09 NOTE — H&P
Gastroenterology Preop Assessment    Patient:   Skinny Jones   :    1950   Facility:   [facility]  Referring/PCP: SHIMA CHANCE  Date:     3/9/2020    Subjective:   Procedure:EGD with dilation  HPI/Reason for procedure:  Dysphagia    Past Medical History:   Diagnosis Date    Adjustment disorder with anxiety     Bipolar 1 disorder ([address]     CH[facility]congestive heart failure[facility])     systolic and diastolic heart failure    COPD (chronic obstructive pulmonary disease) ([address]     Degenerative disc disease     Dementia ([address]     Diabetes mellitus ([address]     Dysphagia     Gastric ulcer     History of blood transfusion     Hx of lumpectomy     right breast    Hyperlipidemia     Hypertension     Mitral valve prolapse     OCD (obsessive compulsive disorder)     Sarcopenia     Seizures ([address]     Thyroid disease     Unspecified psychosis not due to a substance or known physiological condition ([address]     Urinary incontinence      Past Surgical History:   Procedure Laterality Date    COLONOSCOPY      unknown of date    COLONOSCOPY  10/05/2016    polyps    COLONOSCOPY  2017    Poor prep; normal colon    CYSTOSCOPY      ESOPHAGEAL DILATATION N/A 2019    ESOPHAGEAL DILATION Elane Spillers performed by Cory Dunham DO at [address] [place] [address] Right     leg    HIP SURGERY      bilateral fracture pinning    OTHER SURGICAL HISTORY Bilateral 05/13/15    cysto, udil, bilateral retrogrades    TONSILLECTOMY      UPPER GASTROINTESTINAL ENDOSCOPY N/A 2018    EGD BIOPSY performed by Cory Dunham DO at [place] [address]  2019    EGD DIAGNOSTIC ONLY performed by Cory Dunham DO at [place] [address]         Social:   Social History     Tobacco Use    Smoking status: Current Every Day Smoker     Packs/day: 1.00     Years: 40.00     Pack years: 40.00    Smokeless tobacco: Never Used   Substance Use Topics    Alcohol use: No     Family:   Family History   Problem Relation Age of Onset    Stroke Mother     Cancer Father         colon    Cancer Maternal Grandmother         breast    Heart Disease Paternal Grandmother        Scheduled Medications:    sodium chloride flush  10 mL Intravenous 2 times per day       Current Medications:    Prior to Admission medications    Medication Sig Start Date End Date Taking?  Authorizing Provider   omeprazole (PRILOSEC) 20 MG delayed release capsule Take 20 mg by mouth daily    Historical Provider, MD   fluticasone-salmeterol (ADVAIR) 100-50 MCG/DOSE diskus inhaler Inhale 1 puff into the lungs every 12 hours    Historical Provider, MD   polyvinyl alcohol (ARTIFICIAL TEARS) 1.4 % ophthalmic solution Place 2 drops into both eyes every 4 hours as needed    Historical Provider, MD   busPIRone (BUSPAR) 30 MG tablet Take 30 mg by mouth 2 times daily    Historical Provider, MD   Calcium Carbonate 500 MG CHEW Take by mouth every 8 hours as needed    Historical Provider, MD   furosemide (LASIX) 20 MG tablet Take 20 mg by mouth daily    Historical Provider, MD   Ferrous Sulfate (IRON) 325 (65 Fe) MG TABS Take by mouth 2 times daily    Historical Provider, MD   loratadine (CLARITIN) 10 MG tablet Take 10 mg by mouth daily    Historical Provider, MD   losartan (COZAAR) 25 MG tablet Take 12.5 mg by mouth daily    Historical Provider, MD   Melatonin 5 MG CAPS Take by mouth nightly    Historical Provider, MD   magnesium hydroxide (MILK OF MAGNESIA) 400 MG/5ML suspension Take 30 mLs by mouth daily as needed for Constipation    Historical Provider, MD   aluminum & magnesium hydroxide-simethicone (MAALOX) 200-200-20 MG/5ML SUSP suspension Take 30 mLs by mouth every 4 hours as needed for Indigestion    Historical Provider, MD   nicotine (NICODERM CQ) 14 MG/24HR Place 1 patch onto the skin every 24 hours    Historical Provider, MD   nystatin (MYCOSTATIN) 715695 UNIT/GM cream Apply topically 2 times daily as needed for Dry Skin    Historical Provider, MD   oxyCODONE-acetaminophen (PERCOCET) 5-325 MG per tablet Take 1 tablet by mouth 4 times daily. Historical Provider, MD   psyllium (METAMUCIL) 58.6 % powder Take 1 packet by mouth three times a week M-W-F    Historical Provider, MD   Simethicone 180 MG CAPS Take by mouth 3 times daily    Historical Provider, MD   SITagliptin (JANUVIA) 50 MG tablet Take 50 mg by mouth daily    Historical Provider, MD   cariprazine hcl (VRAYLAR) 3 MG CAPS capsule Take 3 mg by mouth daily    Historical Provider, MD   cloNIDine (CATAPRES) 0.1 MG tablet Take 0.1 mg by mouth every 8 hours as needed For SBP >170 and DBP > 100 2/15/19   Historical Provider, MD   ALPRAZolam Yolis Simpler) 1 MG tablet Take 1 mg by mouth 4 times daily.  QID and every 8 hours prn 5/1/19   Historical Provider, MD   meclizine (ANTIVERT) 25 MG tablet Take 25 mg by mouth 3 times daily  5/9/19   Historical Provider, MD   polyethylene glycol (GLYCOLAX) powder Take 17 g by mouth daily as needed  2/11/19   Historical Provider, MD   MYRBETRIQ 25 MG TB24 Take 25 mg by mouth daily  5/13/19   Historical Provider, MD   tamsulosin (FLOMAX) 0.4 MG capsule Take 0.4 mg by mouth nightly  4/11/19   Historical Provider, MD   calcium-vitamin D (OSCAL 500/200 D-3) 500-200 MG-UNIT per tablet Take 1 tablet by mouth 2 times daily     Historical Provider, MD   vitamin D (ERGOCALCIFEROL) 00999 units CAPS capsule Take 50,000 Units by mouth every 30 days  4/19/19   Historical Provider, MD   QUEtiapine (SEROQUEL) 200 MG tablet Take  mg by mouth 2 times daily Takes 50 mg in am and 200 mg in pm 5/13/19   Historical Provider, MD   fluticasone (FLONASE) 50 MCG/ACT nasal spray 1 spray by Nasal route 2 times daily     Historical Provider, MD   atorvastatin (LIPITOR) 20 MG tablet Take 20 mg by mouth nightly    Historical Provider, MD   docusate sodium (COLACE) 100 MG capsule Take 200 mg by mouth

## 2020-03-09 NOTE — PROGRESS NOTES
POCT      Blood Glucose:      (Normal Range 70-99)  121    PT:      (Normal Range 9.8 - 13)    INR:      (Normal Range 0.86-1.14)

## 2020-03-09 NOTE — ANESTHESIA PRE PROCEDURE
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.9    Tobacco use Z72.0       Past Medical History:        Diagnosis Date    Adjustment disorder with anxiety     Bipolar 1 disorder ([facility]     [facility]congest[facility] failure) [facility]     systolic and diastolic heart failure    COPD (chronic obstructive pulmonary disease) (HCC)     Degenerative disc disease     Dementia ([facility]     Diabetes mellitus ([facility]     Dysphagia     Gastric ulcer     History of blood transfusion     Hx of lumpectomy     right breast    Hyperlipidemia     Hypertension     Mitral valve prolapse     OCD (obsessive compulsive disorder)     Sarcopenia     Seizures ([facility]     Thyroid disease     Unspecified psychosis not due to a substance or known physiological condition ([facility]     Urinary incontinence        Past Surgical History:        Procedure Laterality Date    COLONOSCOPY      unknown of date    COLONOSCOPY  10/05/2016    polyps    COLONOSCOPY  11/21/2017    Poor prep; normal colon    CYSTOSCOPY      ESOPHAGEAL DILATATION N/A 2/25/2019    ESOPHAGEAL DILATION JOSH performed by Rin Maradiaga DO at [facility] Right     leg    HIP SURGERY      bilateral fracture pinning    OTHER SURGICAL HISTORY Bilateral 05/13/15    cysto, udil, bilateral retrogrades    TONSILLECTOMY      UPPER GASTROINTESTINAL ENDOSCOPY N/A 12/20/2018    EGD BIOPSY performed by Rin Maradiaga DO at [address] [place] [address]  2/25/2019    EGD DIAGNOSTIC ONLY performed by Rin Maradiaga DO at [place] [address]         Social History:    Social History     Tobacco Use    Smoking status: Current Every Day Smoker     Packs/day: 1.00     Years: 40.00     Pack years: 40.00    Smokeless tobacco: Never Used   Substance Use Topics    Alcohol use:  No                                Ready to quit: Not Answered  Counseling given: Not Answered      Vital

## 2020-03-09 NOTE — ANESTHESIA POSTPROCEDURE EVALUATION
Department of Anesthesiology  Postprocedure Note    Patient: Lamin Abdul  MRN: [id number]  YOB: 1950  Date of evaluation: 3/9/2020  Time:  9:49 AM     Procedure Summary     Date:  03/09/20 Room / Location:  [address] [place] [address]    Anesthesia Start:  2249 Anesthesia Stop:  5697    Procedures:       EGD ESOPHAGOGASTRODUODENOSCOPY DILATATION - Texie Servant (N/A )      EGD BIOPSY Diagnosis:       Peptic ulcer of stomach, unspecified chronicity      (Peptic ulcer of stomach, unspecified chronicity [K25.9])    Surgeon:  Maurice Gardiner DO Responsible Provider:  Augustina Gomez MD    Anesthesia Type:  TIVA ASA Status:  3          Anesthesia Type: TIVA    Forrest Phase I: Forrest Score: 10    Forrest Phase II: Forrest Score: 10    Last vitals: Reviewed and per EMR flowsheets.    Vitals:    03/09/20 0807 03/09/20 0846 03/09/20 0900 03/09/20 0910   BP: 100/67 (!) 129/58 (!) 147/68 (!) 166/85   Pulse: 79 82 85 83   Resp: 16 18 18 18   Temp: 98.4 °F (36.9 °C)      SpO2: 97% 100% 98% 97%   Weight: 229 lb (103.9 kg)      Height: 5' 5\" (1.651 m)          Anesthesia Post Evaluation    Patient location during evaluation: bedside  Patient participation: complete - patient participated  Level of consciousness: awake and alert  Airway patency: patent  Nausea & Vomiting: no nausea  Complications: no  Cardiovascular status: hemodynamically stable  Respiratory status: acceptable  Hydration status: euvolemic

## 2020-11-04 ENCOUNTER — OFFICE VISIT (OUTPATIENT)
Dept: ORTHOPEDIC SURGERY | Age: 70
End: 2020-11-04
Payer: MEDICARE

## 2020-11-04 VITALS — HEIGHT: 64 IN | BODY MASS INDEX: 39.09 KG/M2 | WEIGHT: 229 LBS

## 2020-11-04 PROCEDURE — 99203 OFFICE O/P NEW LOW 30 MIN: CPT | Performed by: ORTHOPAEDIC SURGERY

## 2020-11-04 NOTE — PROGRESS NOTES
MD Bobby Varma, [place]         Orthopaedic Surgery and Sports Medicine  Encounter date 11/4/2020  Patient Name: Imelda Son  YOB: 1950  Patient's PCP is SHIMA CHANCE    SUBJECTIVE  Chief Complaint:  Knee Pain (RIGHT  )      History of Present Illness:  Imelda Son is a 78 y.o. female here regarding right knee pain. This is a lady from the [facility] who is here for evaluation of her right knee. She not a patient that we have seen before. My understanding is that she is here for evaluation of her knee because of a fall which occurred yesterday. Her primary complaint is of pain everywhere. Her primary request is a Percocet refill. She states that she is totally nonambulatory. She states that she is in the nursing home and she desires not to be there. She states that she cannot attend outpatient physical therapy or therapy at the nursing home because her pain is too great. She initially asked why she is here at this appointment. She does not feel that the fall yesterday was significant. The patient is currently ambulating in wheelchair. Today I asked if she could stand and walk for us she declined. Location: global.  Spite her appointment being made for right knee pain she had does not want to focus on the right knee. Quality: aching and sharp  Pain Scale: 8/10, by report from the patient  Context: overall course is unchanged   Alleviating Factors: rest  Exacerbating Factors: activity  Associated Symptoms: swelling   Limiting behavior: Yes    Sleep pattern is affected by the chief complaint: Yes  TThe patient is not working. Pain Assessment:       Review of Systems:  [place] [facility] review of systems has been performed by intake and observation. All past and current ROS forms have been scanned into the medical record.  She has been instructed to contact her primary care provider regarding ROS issues if not already being addressed at this time. There are no recent changes.  The most recent ROS was scanned into media on 11/4/2020    Past Medical History:  (see most recent intake form scanned into media on above date)  Past Medical History:   Diagnosis Date    Adjustment disorder with anxiety     Bipolar 1 disorder ([address]     CH[facility]congest[facility]) [facility])     systolic and diastolic heart failure    COPD (chronic obstructive pulmonary disease) ([address]     Degenerative disc disease     Dementia ([address]     Diabetes mellitus ([address]     Dysphagia     Gastric ulcer     History of blood transfusion     Hx of lumpectomy     right breast    Hyperlipidemia     Hypertension     Mitral valve prolapse     OCD (obsessive compulsive disorder)     Sarcopenia     Seizures ([address]     Thyroid disease     Unspecified psychosis not due to a substance or known physiological condition ([address]     Urinary incontinence        Past Surgical History:  (see most recent intake form scanned into media on above date)  Past Surgical History:   Procedure Laterality Date    COLONOSCOPY      unknown of date    COLONOSCOPY  10/05/2016    polyps    COLONOSCOPY  11/21/2017    Poor prep; normal colon    CYSTOSCOPY      ESOPHAGEAL DILATATION N/A 2/25/2019    ESOPHAGEAL DILATION Adrian Iona performed by Christel Alexander DO at [facility] Right     leg    HIP SURGERY      bilateral fracture pinning    OTHER SURGICAL HISTORY Bilateral 05/13/15    cysto, udil, bilateral retrogrades    TONSILLECTOMY      UPPER GASTROINTESTINAL ENDOSCOPY N/A 12/20/2018    EGD BIOPSY performed by Christel Alexander DO at [address]  2/25/2019    EGD DIAGNOSTIC ONLY performed by Christel Alexander DO at [address] N/A 3/9/2020    EGD ESOPHAGOGASTRODUODENOSCOPY DILATATION - 5-325 MG per tablet Take 1 tablet by mouth 4 times daily.  psyllium (METAMUCIL) 58.6 % powder Take 1 packet by mouth three times a week M-W-F      Simethicone 180 MG CAPS Take by mouth 3 times daily      SITagliptin (JANUVIA) 50 MG tablet Take 50 mg by mouth daily      cariprazine hcl (VRAYLAR) 3 MG CAPS capsule Take 3 mg by mouth daily      cloNIDine (CATAPRES) 0.1 MG tablet Take 0.1 mg by mouth every 8 hours as needed For SBP >170 and DBP > 100      ALPRAZolam (XANAX) 1 MG tablet Take 1 mg by mouth 4 times daily. QID and every 8 hours prn      meclizine (ANTIVERT) 25 MG tablet Take 25 mg by mouth 3 times daily       polyethylene glycol (GLYCOLAX) powder Take 17 g by mouth daily as needed   0    MYRBETRIQ 25 MG TB24 Take 25 mg by mouth daily       tamsulosin (FLOMAX) 0.4 MG capsule Take 0.4 mg by mouth nightly       calcium-vitamin D (OSCAL 500/200 D-3) 500-200 MG-UNIT per tablet Take 1 tablet by mouth 2 times daily       vitamin D (ERGOCALCIFEROL) 81786 units CAPS capsule Take 50,000 Units by mouth every 30 days       QUEtiapine (SEROQUEL) 200 MG tablet Take  mg by mouth 2 times daily Takes 50 mg in am and 200 mg in pm      fluticasone (FLONASE) 50 MCG/ACT nasal spray 1 spray by Nasal route 2 times daily       atorvastatin (LIPITOR) 20 MG tablet Take 20 mg by mouth nightly      docusate sodium (COLACE) 100 MG capsule Take 200 mg by mouth daily      therapeutic multivitamin-minerals (THERAGRAN-M) tablet Take 1 tablet by mouth daily.  phenytoin (DILANTIN) 100 MG ER capsule Take 200 mg by mouth 2 times daily.  phenytoin (DILANTIN) 100 MG ER capsule Take 200 mg by mouth 3 times daily       topiramate (TOPAMAX) 100 MG tablet Take 150 mg by mouth daily       albuterol (PROVENTIL HFA;VENTOLIN HFA) 108 (90 BASE) MCG/ACT inhaler Inhale 2 puffs into the lungs 2 times daily.         carvedilol (COREG) 6.25 MG tablet Take 6.25 mg by mouth 2 times daily Indications: take dos        No current facility-administered medications for this visit. Goal for treatment: Improve function and decrease pain    OBJECTIVE  PHYSICAL EXAM  Vital Signs: There were no vitals filed for this visit. Body mass index is 39.31 kg/m². General Appearance: Patient is adequately groomed with no evidence of malnutrition   Orientation: Patient is alert and oriented to person, place and time  Mood and Affect: Inappropriate/Incongruent and irritable  Gait and Station: Not visualized. The patient cannot bear weight on the extremity. Right Knee Examination  Inspection:  Knee alignment: neutral           Possibly some swelling noted, secured by her obesity with a BMI of 40           No erythema or ecchymosis. Palpation: mild tenderness to palpation on the global.  Possibly a small effusion noted. Range of Motion: Did not cooperate with active range of motion. Passive passive range of motion revealed an arc of about 10 to 100 degrees of flexion with her in the sitting position    Stability and Special Testing: Lachman test: negative             Anterior drawer: negative            Posterior drawer: negative              Strength: No gross motor weakness noted. All muscle groups appear to be functioning. Motor exam of the lower extremities show quadriceps, hamstrings, foot dorsiflexion and plantarflexion grossly intact. Neurologic: Sensation to both feet is grossly intact to light touch. Vascular: The bilateral lower extremities are warm and well-perfused with brisk capillary refill. Lymphatic: The lymphatic examination bilaterally reveals all areas to be without enlargement or induration    Skin: intact with no cellulitis, rashes, ulcerations, lymphedema or cutaneous lesions noted. Additional Examinations:  Left Lower Extremity: Examination of the left lower extremity does not show any tenderness, deformity or injury. Range of motion is unremarkable. There is no gross instability.   There are no rashes, ulcerations or lesions. Strength and tone are normal.      DIAGNOSTICS  Xrays obtained in office today: Yes  Xrays reviewed today: Yes  Four views of the right knee   Fracture: No  Dislocation: No  She has an intramedullary meena in her femur which can be visualized on her knee x-ray. It extends down near the knee joint but certainly not in the knee. There is no evidence of fracture or dislocation on these radiographs. Knee joint arthritis: mild  In sincerity, her x-rays are actually very good relative to her clinical condition. ASSESSMENT (Medical Decision Making)    Roya Resendiz is a 78 y.o. female with the following diagnosis: No evidence of fracture dislocation or other acute amount of abnormality involving the right knee. No evidence of significant injury secondary to her fall yesterday. ICD-10-CM    1. Right knee pain, unspecified chronicity  M25.561 XR KNEE RIGHT (MIN 4 VIEWS)           PLAN (Medical Decision Making)  Office Procedures:  Orders Placed This Encounter   Procedures    XR KNEE RIGHT (MIN 4 VIEWS)       Treatment Plan:    I discussed the diagnosis and treatment options with Roya Resendiz today. I do not believe that any specific treatment is indicated for her right knee related to a fall of yesterday. Unfortunately it appears that she is more focused on obtaining pain medication then actually improving her activity level. This is not a patient that I feel requires any type of surgical evaluation or for surgical procedure in the future. She will follow-up with us as needed. Healthy Lifestyle Measures: Patient education measures were discussed and materials distributed where indicated. Posture education and proper lifting and carrying techniques. Weight management was discussed when indicated. Non-steroidal anti-inflammatories medications (NSAIDs) can be used to assist with pain control and to reduce inflammatory changes.   These medications may be over-the-counter or prescribed. We discussed taking the NSAID properly and the precautions. The patient understands that this medication may potentially interfere with other medications. Patient was also instructed to immediately discontinue the medication is there is any possible complication. Garrett Plaza was instructed to call the office if her symptoms worsen or if new symptoms appear prior to the next scheduled visit. She is specifically instructed to contact the office between now and schedule appointment if she has concerns related to her condition or if she needs assistance in scheduling any above tests. She is welcome to call for an appointment sooner if she has any additional concerns or questions. This dictation was performed with a verbal recognition program (DRAGON) and it was checked for errors. It is possible that there are still dictated errors within this office note. If so, please bring any errors to my attention for an addendum. All efforts were made to ensure that this office note is accurate.

## (undated) DEVICE — ELECTRODE ECG MONITR FOAM TEAR DROP ADLT RED

## (undated) DEVICE — CONMED SCOPE SAVER BITE BLOCK, 20X27 MM: Brand: SCOPE SAVER

## (undated) DEVICE — FORCEPS ENDOSCP BX L230CM DIA28MM ALGTR CUP SPEC RETRV GI

## (undated) DEVICE — ENDO CARRY-ON PROCEDURE KIT INCLUDES SUCTION TUBING, LUBRICANT, GAUZE, BIOHAZARD STICKER, TRANSPORT PAD AND INTERCEPT BEDSIDE KIT.: Brand: ENDO CARRY-ON PROCEDURE KIT

## (undated) DEVICE — Z DISCONTINUED USE 2276105 GOWN PROTCT UNIV CHST W28IN L49IN SL 24IN BLU SPUNBOND FLM

## (undated) DEVICE — KIT INF CTRL 2OZ LUB TBNG L12FT DBL END BRSH SYR OP4

## (undated) DEVICE — CANNULA,OXY,ADULT,SUPERSOFT,W/7'TUB,SC: Brand: MEDLINE INDUSTRIES, INC.